# Patient Record
Sex: FEMALE | Race: BLACK OR AFRICAN AMERICAN
[De-identification: names, ages, dates, MRNs, and addresses within clinical notes are randomized per-mention and may not be internally consistent; named-entity substitution may affect disease eponyms.]

---

## 2018-02-13 ENCOUNTER — HOSPITAL ENCOUNTER (OUTPATIENT)
Dept: HOSPITAL 84 - D.ECHO | Age: 58
End: 2018-02-13
Attending: INTERNAL MEDICINE
Payer: MEDICAID

## 2018-02-13 DIAGNOSIS — R06.02: Primary | ICD-10-CM

## 2018-03-06 ENCOUNTER — HOSPITAL ENCOUNTER (OUTPATIENT)
Dept: HOSPITAL 84 - D.CT | Age: 58
Discharge: HOME | End: 2018-03-06
Attending: INTERNAL MEDICINE
Payer: MEDICAID

## 2018-03-06 DIAGNOSIS — J98.4: Primary | ICD-10-CM

## 2018-10-03 ENCOUNTER — HOSPITAL ENCOUNTER (OUTPATIENT)
Dept: HOSPITAL 84 - D.RT | Age: 58
Discharge: HOME | End: 2018-10-03
Attending: INTERNAL MEDICINE
Payer: MEDICAID

## 2018-10-03 DIAGNOSIS — R06.02: Primary | ICD-10-CM

## 2019-09-19 ENCOUNTER — HOSPITAL ENCOUNTER (OUTPATIENT)
Dept: HOSPITAL 84 - D.RT | Age: 59
Discharge: HOME | End: 2019-09-19
Attending: INTERNAL MEDICINE
Payer: MEDICAID

## 2019-09-19 DIAGNOSIS — R06.02: Primary | ICD-10-CM

## 2021-05-12 ENCOUNTER — HOSPITAL ENCOUNTER (INPATIENT)
Dept: HOSPITAL 84 - D.ER | Age: 61
LOS: 7 days | Discharge: HOME | DRG: 292 | End: 2021-05-19
Attending: FAMILY MEDICINE | Admitting: FAMILY MEDICINE
Payer: MEDICAID

## 2021-05-12 VITALS
BODY MASS INDEX: 53.92 KG/M2 | BODY MASS INDEX: 53.92 KG/M2 | BODY MASS INDEX: 53.92 KG/M2 | WEIGHT: 293 LBS | HEIGHT: 62 IN

## 2021-05-12 VITALS — DIASTOLIC BLOOD PRESSURE: 62 MMHG | SYSTOLIC BLOOD PRESSURE: 115 MMHG

## 2021-05-12 VITALS — SYSTOLIC BLOOD PRESSURE: 137 MMHG | DIASTOLIC BLOOD PRESSURE: 91 MMHG

## 2021-05-12 VITALS — DIASTOLIC BLOOD PRESSURE: 59 MMHG | SYSTOLIC BLOOD PRESSURE: 116 MMHG

## 2021-05-12 VITALS — DIASTOLIC BLOOD PRESSURE: 62 MMHG | SYSTOLIC BLOOD PRESSURE: 117 MMHG

## 2021-05-12 VITALS — DIASTOLIC BLOOD PRESSURE: 62 MMHG | SYSTOLIC BLOOD PRESSURE: 130 MMHG

## 2021-05-12 VITALS — SYSTOLIC BLOOD PRESSURE: 111 MMHG | DIASTOLIC BLOOD PRESSURE: 54 MMHG

## 2021-05-12 DIAGNOSIS — R06.00: ICD-10-CM

## 2021-05-12 DIAGNOSIS — D64.9: ICD-10-CM

## 2021-05-12 DIAGNOSIS — N18.9: ICD-10-CM

## 2021-05-12 DIAGNOSIS — E66.01: ICD-10-CM

## 2021-05-12 DIAGNOSIS — I50.9: ICD-10-CM

## 2021-05-12 DIAGNOSIS — N17.9: ICD-10-CM

## 2021-05-12 DIAGNOSIS — E11.22: ICD-10-CM

## 2021-05-12 DIAGNOSIS — J43.9: ICD-10-CM

## 2021-05-12 DIAGNOSIS — I13.0: Primary | ICD-10-CM

## 2021-05-12 DIAGNOSIS — E11.21: ICD-10-CM

## 2021-05-12 DIAGNOSIS — R00.1: ICD-10-CM

## 2021-05-12 DIAGNOSIS — K21.9: ICD-10-CM

## 2021-05-12 LAB
ALBUMIN SERPL-MCNC: 2.7 G/DL (ref 3.4–5)
ALP SERPL-CCNC: 177 U/L (ref 30–120)
ALT SERPL-CCNC: 103 U/L (ref 10–68)
ANION GAP SERPL CALC-SCNC: 13.7 MMOL/L (ref 8–16)
APTT BLD: 37.7 SECONDS (ref 22.8–39.4)
BASOPHILS NFR BLD AUTO: 0.3 % (ref 0–2)
BILIRUB SERPL-MCNC: 0.41 MG/DL (ref 0.2–1.3)
BUN SERPL-MCNC: 65 MG/DL (ref 7–18)
CALCIUM SERPL-MCNC: 8.9 MG/DL (ref 8.5–10.1)
CHLORIDE SERPL-SCNC: 106 MMOL/L (ref 98–107)
CK MB SERPL-MCNC: 1.2 U/L (ref 0–3.6)
CK SERPL-CCNC: 50 UL (ref 21–215)
CO2 SERPL-SCNC: 24.7 MMOL/L (ref 21–32)
CREAT SERPL-MCNC: 3.1 MG/DL (ref 0.6–1.3)
D DIMER PPP FEU-MCNC: 2.26 UG/MLFEU (ref 0.2–0.54)
EOSINOPHIL NFR BLD: 1.7 % (ref 0–7)
ERYTHROCYTE [DISTWIDTH] IN BLOOD BY AUTOMATED COUNT: 17.2 % (ref 11.5–14.5)
GLOBULIN SER-MCNC: 5 G/L
GLUCOSE SERPL-MCNC: 134 MG/DL (ref 74–106)
HCT VFR BLD CALC: 31.5 % (ref 36–48)
HGB BLD-MCNC: 9.8 G/DL (ref 12–16)
IMM GRANULOCYTES NFR BLD: 0.5 % (ref 0–5)
INR PPP: 1.84 (ref 0.85–1.17)
LYMPHOCYTES # BLD: 1.96 10X3/UL (ref 1.18–3.74)
LYMPHOCYTES NFR BLD AUTO: 18.7 % (ref 15–50)
MCH RBC QN AUTO: 26.7 PG (ref 26–34)
MCHC RBC AUTO-ENTMCNC: 31.1 G/DL (ref 31–37)
MCV RBC: 85.8 FL (ref 80–100)
MONOCYTES NFR BLD: 5.4 % (ref 2–11)
NEUTROPHILS # BLD: 7.68 10X3/UL (ref 1.56–6.13)
NEUTROPHILS NFR BLD AUTO: 73.4 % (ref 40–80)
OSMOLALITY SERPL CALC.SUM OF ELEC: 299 MOSM/KG (ref 275–300)
PLATELET # BLD: 360 10X3/UL (ref 130–400)
PMV BLD AUTO: 10.2 FL (ref 7.4–10.4)
POTASSIUM SERPL-SCNC: 4.4 MMOL/L (ref 3.5–5.1)
PROT SERPL-MCNC: 7.7 G/DL (ref 6.4–8.2)
PROTHROMBIN TIME: 19.7 SECONDS (ref 11.6–15)
RBC # BLD AUTO: 3.67 10X6/UL (ref 4–5.4)
SODIUM SERPL-SCNC: 140 MMOL/L (ref 136–145)
TROPONIN I SERPL-MCNC: < 0.017 NG/ML (ref 0–0.06)
WBC # BLD AUTO: 10.5 10X3/UL (ref 4.8–10.8)

## 2021-05-12 NOTE — NUR
REC'D TO ROOM 29779 AT THIS TIME VIA W/C. AWAKE AND ALERT. RESP EVEN AND
UNLABORED WITH NO DISTRESS NOTED. CAN EXPRESS NEEDS AND WANTS. NO C/O NOTED OR
VOICED AT THIS TIME. UP AB JOSE C. ASSESSMENT COMPLETED. C/L IN REACH AT BEDSIDE.

## 2021-05-12 NOTE — NUR
PT IN BED AT THIS TIME. ALERT AND ORIENTED. WEARING O2 AT 2L VIA NASAL
CANULA. ABLE TO MAKE WANTS AND NEEDS KNOWN CLEARLY. PT STATES SHE WEARS A CPAP
AT HOME BUT DOES NOT HAVE IT WITH HER. BED IN LOWEST POSITION WITH CALL LIGHT
IN REACH.

## 2021-05-13 VITALS — DIASTOLIC BLOOD PRESSURE: 85 MMHG | SYSTOLIC BLOOD PRESSURE: 143 MMHG

## 2021-05-13 VITALS — DIASTOLIC BLOOD PRESSURE: 61 MMHG | SYSTOLIC BLOOD PRESSURE: 141 MMHG

## 2021-05-13 VITALS — SYSTOLIC BLOOD PRESSURE: 136 MMHG | DIASTOLIC BLOOD PRESSURE: 43 MMHG

## 2021-05-13 VITALS — DIASTOLIC BLOOD PRESSURE: 44 MMHG | SYSTOLIC BLOOD PRESSURE: 134 MMHG

## 2021-05-13 VITALS — SYSTOLIC BLOOD PRESSURE: 132 MMHG | DIASTOLIC BLOOD PRESSURE: 52 MMHG

## 2021-05-13 VITALS — SYSTOLIC BLOOD PRESSURE: 138 MMHG | DIASTOLIC BLOOD PRESSURE: 44 MMHG

## 2021-05-13 LAB
ALBUMIN SERPL-MCNC: 2.6 G/DL (ref 3.4–5)
ALP SERPL-CCNC: 153 U/L (ref 30–120)
ALT SERPL-CCNC: 97 U/L (ref 10–68)
ANION GAP SERPL CALC-SCNC: 14.5 MMOL/L (ref 8–16)
BACTERIA #/AREA URNS HPF: (no result) HPF
BASOPHILS NFR BLD AUTO: 0.1 % (ref 0–2)
BILIRUB SERPL-MCNC: 0.41 MG/DL (ref 0.2–1.3)
BILIRUB SERPL-MCNC: NEGATIVE MG/DL
BUN SERPL-MCNC: 59 MG/DL (ref 7–18)
CALCIUM SERPL-MCNC: 8.8 MG/DL (ref 8.5–10.1)
CHLORIDE SERPL-SCNC: 105 MMOL/L (ref 98–107)
CO2 SERPL-SCNC: 23.7 MMOL/L (ref 21–32)
CREAT SERPL-MCNC: 2.9 MG/DL (ref 0.6–1.3)
CREATININE - URINE: 30.9 MG/DL (ref 30–125)
EOSINOPHIL NFR BLD: 2.4 % (ref 0–7)
ERYTHROCYTE [DISTWIDTH] IN BLOOD BY AUTOMATED COUNT: 17 % (ref 11.5–14.5)
GLOBULIN SER-MCNC: 5.1 G/L
GLUCOSE SERPL-MCNC: 71 MG/DL (ref 74–106)
HCT VFR BLD CALC: 30.8 % (ref 36–48)
HGB BLD-MCNC: 9.5 G/DL (ref 12–16)
IMM GRANULOCYTES NFR BLD: 0.1 % (ref 0–5)
KETONES UR STRIP-MCNC: NEGATIVE MG/DL
LYMPHOCYTES # BLD: 2.22 10X3/UL (ref 1.18–3.74)
LYMPHOCYTES NFR BLD AUTO: 22.9 % (ref 15–50)
MCH RBC QN AUTO: 26.7 PG (ref 26–34)
MCHC RBC AUTO-ENTMCNC: 30.8 G/DL (ref 31–37)
MCV RBC: 86.5 FL (ref 80–100)
MONOCYTES NFR BLD: 5.7 % (ref 2–11)
NEUTROPHILS # BLD: 6.66 10X3/UL (ref 1.56–6.13)
NEUTROPHILS NFR BLD AUTO: 68.8 % (ref 40–80)
NITRITE UR-MCNC: NEGATIVE MG/ML
OSMOLALITY SERPL CALC.SUM OF ELEC: 292 MOSM/KG (ref 275–300)
PH UR STRIP: 5 [PH] (ref 5–8)
PLATELET # BLD: 370 10X3/UL (ref 130–400)
PMV BLD AUTO: 10.3 FL (ref 7.4–10.4)
POTASSIUM SERPL-SCNC: 4.2 MMOL/L (ref 3.5–5.1)
PROT SERPL-MCNC: 7.7 G/DL (ref 6.4–8.2)
PROT UR-MCNC: 50.4 MG/DL (ref 0–11.9)
PROT/CREAT UR: 1.6 MG/G
RBC # BLD AUTO: 3.56 10X6/UL (ref 4–5.4)
SODIUM SERPL-SCNC: 139 MMOL/L (ref 136–145)
SQUAMOUS #/AREA URNS HPF: (no result) HPF (ref 0–4)
UROBILINOGEN UR-MCNC: NORMAL MG/DL (ref ?–2)
WBC # BLD AUTO: 9.7 10X3/UL (ref 4.8–10.8)
WBC #/AREA URNS HPF: (no result) HPF (ref 0–4)

## 2021-05-13 NOTE — NUR
PT REMAINS SOB WHEN AMBULATING TO BATHROOM. BSC BROUGHT TO ROOM AND PT STATES
IT WAS NOT AS HARD TO BREATHE USING IT. UNABLE TO OBTAIN URINE SPECIMEN DUE TO
    STOOL IN [place] AND THEN WIPES IN [place]. CALL LIGHT IN REACH.

## 2021-05-13 NOTE — NUR
PT IN BED RESTING. ALERT AND ORIENTED. WEARING O2 AT 2L NASAL CANULA. PT GETS
SHORT OF BREATH WITH EXERTION. SCDS ON. ABLE TO MAKE WANTS AND NEEDS KNOWN TO
STAFF. USES BEDSIDE COMMODE X1 ASSIST. BED IN LOWEST POSITION WITH CALL LIGHT
IN REACH.

## 2021-05-14 VITALS — DIASTOLIC BLOOD PRESSURE: 79 MMHG | SYSTOLIC BLOOD PRESSURE: 128 MMHG

## 2021-05-14 VITALS — SYSTOLIC BLOOD PRESSURE: 142 MMHG | DIASTOLIC BLOOD PRESSURE: 47 MMHG

## 2021-05-14 VITALS — SYSTOLIC BLOOD PRESSURE: 141 MMHG | DIASTOLIC BLOOD PRESSURE: 77 MMHG

## 2021-05-14 VITALS — SYSTOLIC BLOOD PRESSURE: 142 MMHG | DIASTOLIC BLOOD PRESSURE: 77 MMHG

## 2021-05-14 VITALS — DIASTOLIC BLOOD PRESSURE: 73 MMHG | SYSTOLIC BLOOD PRESSURE: 171 MMHG

## 2021-05-14 VITALS — SYSTOLIC BLOOD PRESSURE: 113 MMHG | DIASTOLIC BLOOD PRESSURE: 47 MMHG

## 2021-05-14 LAB
ALBUMIN SERPL-MCNC: 2.4 G/DL (ref 3.4–5)
ALP SERPL-CCNC: 142 U/L (ref 30–120)
ALT SERPL-CCNC: 77 U/L (ref 10–68)
ANION GAP SERPL CALC-SCNC: 15.6 MMOL/L (ref 8–16)
BASOPHILS NFR BLD AUTO: 0.1 % (ref 0–2)
BILIRUB SERPL-MCNC: 0.39 MG/DL (ref 0.2–1.3)
BUN SERPL-MCNC: 56 MG/DL (ref 7–18)
CALCIUM SERPL-MCNC: 8.5 MG/DL (ref 8.5–10.1)
CHLORIDE SERPL-SCNC: 105 MMOL/L (ref 98–107)
CO2 SERPL-SCNC: 23.4 MMOL/L (ref 21–32)
CREAT SERPL-MCNC: 2.9 MG/DL (ref 0.6–1.3)
EOSINOPHIL NFR BLD: 2.5 % (ref 0–7)
ERYTHROCYTE [DISTWIDTH] IN BLOOD BY AUTOMATED COUNT: 16.9 % (ref 11.5–14.5)
FERRITIN SERPL-MCNC: 89 NG/ML (ref 3–244)
GLOBULIN SER-MCNC: 5 G/L
GLUCOSE SERPL-MCNC: 81 MG/DL (ref 74–106)
HCT VFR BLD CALC: 30.1 % (ref 36–48)
HGB BLD-MCNC: 9.1 G/DL (ref 12–16)
IMM GRANULOCYTES NFR BLD: 0.2 % (ref 0–5)
IRON SERPL-MCNC: 30 UG/DL (ref 35–150)
LYMPHOCYTES # BLD: 2.07 10X3/UL (ref 1.18–3.74)
LYMPHOCYTES NFR BLD AUTO: 21.4 % (ref 15–50)
MCH RBC QN AUTO: 26 PG (ref 26–34)
MCHC RBC AUTO-ENTMCNC: 30.2 G/DL (ref 31–37)
MCV RBC: 86 FL (ref 80–100)
MONOCYTES NFR BLD: 5.4 % (ref 2–11)
NEUTROPHILS # BLD: 6.83 10X3/UL (ref 1.56–6.13)
NEUTROPHILS NFR BLD AUTO: 70.4 % (ref 40–80)
OSMOLALITY SERPL CALC.SUM OF ELEC: 293 MOSM/KG (ref 275–300)
PLATELET # BLD: 349 10X3/UL (ref 130–400)
PMV BLD AUTO: 10.4 FL (ref 7.4–10.4)
POTASSIUM SERPL-SCNC: 4 MMOL/L (ref 3.5–5.1)
PROT SERPL-MCNC: 7.4 G/DL (ref 6.4–8.2)
RBC # BLD AUTO: 3.5 10X6/UL (ref 4–5.4)
SAO2 % BLD FROM PO2: 11 % (ref 15–55)
SODIUM SERPL-SCNC: 140 MMOL/L (ref 136–145)
TIBC SERPL-MCNC: 270 UG/DL (ref 260–445)
UIBC SERPL-MCNC: 240 UG/DL (ref 150–375)
WBC # BLD AUTO: 9.7 10X3/UL (ref 4.8–10.8)

## 2021-05-14 NOTE — NUR
REC'D IN BED AWAKE AND ALERT TO NAME ONLY WITH NOTED CONFUSION. RESP EVEN AND
UNLABORED WITH NO DISTRESS NOTED. CAN EXPRESS NEEDS AND WANTS. ASSESSMENT
COMPLETED. C/L IN REACH AT BEDSIDE.

## 2021-05-15 VITALS — SYSTOLIC BLOOD PRESSURE: 127 MMHG | DIASTOLIC BLOOD PRESSURE: 91 MMHG

## 2021-05-15 VITALS — SYSTOLIC BLOOD PRESSURE: 119 MMHG | DIASTOLIC BLOOD PRESSURE: 54 MMHG

## 2021-05-15 VITALS — SYSTOLIC BLOOD PRESSURE: 117 MMHG | DIASTOLIC BLOOD PRESSURE: 60 MMHG

## 2021-05-15 VITALS — SYSTOLIC BLOOD PRESSURE: 142 MMHG | DIASTOLIC BLOOD PRESSURE: 47 MMHG

## 2021-05-15 VITALS — DIASTOLIC BLOOD PRESSURE: 75 MMHG | SYSTOLIC BLOOD PRESSURE: 96 MMHG

## 2021-05-15 VITALS — SYSTOLIC BLOOD PRESSURE: 123 MMHG | DIASTOLIC BLOOD PRESSURE: 50 MMHG

## 2021-05-15 LAB
ALBUMIN SERPL-MCNC: 2.5 G/DL (ref 3.4–5)
ALP SERPL-CCNC: 144 U/L (ref 30–120)
ALT SERPL-CCNC: 60 U/L (ref 10–68)
ANION GAP SERPL CALC-SCNC: 15 MMOL/L (ref 8–16)
BASOPHILS NFR BLD AUTO: 0.1 % (ref 0–2)
BILIRUB SERPL-MCNC: 0.36 MG/DL (ref 0.2–1.3)
BUN SERPL-MCNC: 59 MG/DL (ref 7–18)
CALCIUM SERPL-MCNC: 8.8 MG/DL (ref 8.5–10.1)
CHLORIDE SERPL-SCNC: 105 MMOL/L (ref 98–107)
CO2 SERPL-SCNC: 24.3 MMOL/L (ref 21–32)
CREAT SERPL-MCNC: 2.9 MG/DL (ref 0.6–1.3)
EOSINOPHIL NFR BLD: 1.8 % (ref 0–7)
ERYTHROCYTE [DISTWIDTH] IN BLOOD BY AUTOMATED COUNT: 17 % (ref 11.5–14.5)
GLOBULIN SER-MCNC: 4.7 G/L
GLUCOSE SERPL-MCNC: 101 MG/DL (ref 74–106)
HCT VFR BLD CALC: 30.1 % (ref 36–48)
HGB BLD-MCNC: 9.1 G/DL (ref 12–16)
IMM GRANULOCYTES NFR BLD: 0.3 % (ref 0–5)
LYMPHOCYTES # BLD: 2.12 10X3/UL (ref 1.18–3.74)
LYMPHOCYTES NFR BLD AUTO: 21 % (ref 15–50)
MCH RBC QN AUTO: 26.3 PG (ref 26–34)
MCHC RBC AUTO-ENTMCNC: 30.2 G/DL (ref 31–37)
MCV RBC: 87 FL (ref 80–100)
MONOCYTES NFR BLD: 6.4 % (ref 2–11)
NEUTROPHILS # BLD: 7.11 10X3/UL (ref 1.56–6.13)
NEUTROPHILS NFR BLD AUTO: 70.4 % (ref 40–80)
OSMOLALITY SERPL CALC.SUM OF ELEC: 295 MOSM/KG (ref 275–300)
PLATELET # BLD: 341 10X3/UL (ref 130–400)
PMV BLD AUTO: 10.2 FL (ref 7.4–10.4)
POTASSIUM SERPL-SCNC: 4.3 MMOL/L (ref 3.5–5.1)
PROT SERPL-MCNC: 7.2 G/DL (ref 6.4–8.2)
RBC # BLD AUTO: 3.46 10X6/UL (ref 4–5.4)
SODIUM SERPL-SCNC: 140 MMOL/L (ref 136–145)
WBC # BLD AUTO: 10.1 10X3/UL (ref 4.8–10.8)

## 2021-05-15 NOTE — NUR
20 G IV RESITED TO RIGHT FOREARM BY YONI RIVER. RESUMED IRON INFUSION. NO OTHER
NEEDS. ASSESSMENT PER FLOWSHEET. WILL CONTINUE TO MONITOR.

## 2021-05-15 NOTE — NUR
REC'D IN BED WITH EYES CLOSED EASILY TO AROUSED WHEN NAME IS CALLED. RESP EVEN
AND UNLABORED WITH NO DISTRESS NOTED. TURN AND REPOSITION SELF AB JOSE C. STAND
BY ASSISTANCE WITH ALL TRANSFERS. ASSESSMENT COMPLETED. C/L IN REACH AT
BEDSIDE.

## 2021-05-16 VITALS — DIASTOLIC BLOOD PRESSURE: 68 MMHG | SYSTOLIC BLOOD PRESSURE: 134 MMHG

## 2021-05-16 VITALS — DIASTOLIC BLOOD PRESSURE: 64 MMHG | SYSTOLIC BLOOD PRESSURE: 153 MMHG

## 2021-05-16 LAB
ALBUMIN SERPL-MCNC: 2.6 G/DL (ref 3.4–5)
ALP SERPL-CCNC: 155 U/L (ref 30–120)
ALT SERPL-CCNC: 56 U/L (ref 10–68)
ANION GAP SERPL CALC-SCNC: 11.7 MMOL/L (ref 8–16)
BASOPHILS NFR BLD AUTO: 0.1 % (ref 0–2)
BILIRUB SERPL-MCNC: 0.31 MG/DL (ref 0.2–1.3)
BUN SERPL-MCNC: 62 MG/DL (ref 7–18)
CALCIUM SERPL-MCNC: 8.7 MG/DL (ref 8.5–10.1)
CHLORIDE SERPL-SCNC: 104 MMOL/L (ref 98–107)
CO2 SERPL-SCNC: 26.8 MMOL/L (ref 21–32)
CREAT SERPL-MCNC: 2.9 MG/DL (ref 0.6–1.3)
EOSINOPHIL NFR BLD: 2.4 % (ref 0–7)
ERYTHROCYTE [DISTWIDTH] IN BLOOD BY AUTOMATED COUNT: 17.1 % (ref 11.5–14.5)
GLOBULIN SER-MCNC: 4.4 G/L
GLUCOSE SERPL-MCNC: 92 MG/DL (ref 74–106)
HCT VFR BLD CALC: 31.3 % (ref 36–48)
HGB BLD-MCNC: 9.3 G/DL (ref 12–16)
IMM GRANULOCYTES NFR BLD: 0.2 % (ref 0–5)
LYMPHOCYTES # BLD: 1.79 10X3/UL (ref 1.18–3.74)
LYMPHOCYTES NFR BLD AUTO: 17.6 % (ref 15–50)
MCH RBC QN AUTO: 25.9 PG (ref 26–34)
MCHC RBC AUTO-ENTMCNC: 29.7 G/DL (ref 31–37)
MCV RBC: 87.2 FL (ref 80–100)
MONOCYTES NFR BLD: 6.5 % (ref 2–11)
NEUTROPHILS # BLD: 7.47 10X3/UL (ref 1.56–6.13)
NEUTROPHILS NFR BLD AUTO: 73.2 % (ref 40–80)
OSMOLALITY SERPL CALC.SUM OF ELEC: 293 MOSM/KG (ref 275–300)
PLATELET # BLD: 331 10X3/UL (ref 130–400)
PMV BLD AUTO: 10.5 FL (ref 7.4–10.4)
POTASSIUM SERPL-SCNC: 4.5 MMOL/L (ref 3.5–5.1)
PROT SERPL-MCNC: 7 G/DL (ref 6.4–8.2)
RBC # BLD AUTO: 3.59 10X6/UL (ref 4–5.4)
SODIUM SERPL-SCNC: 138 MMOL/L (ref 136–145)
WBC # BLD AUTO: 10.2 10X3/UL (ref 4.8–10.8)

## 2021-05-17 VITALS — DIASTOLIC BLOOD PRESSURE: 83 MMHG | SYSTOLIC BLOOD PRESSURE: 169 MMHG

## 2021-05-17 VITALS — SYSTOLIC BLOOD PRESSURE: 114 MMHG | DIASTOLIC BLOOD PRESSURE: 63 MMHG

## 2021-05-17 VITALS — SYSTOLIC BLOOD PRESSURE: 165 MMHG | DIASTOLIC BLOOD PRESSURE: 73 MMHG

## 2021-05-17 VITALS — DIASTOLIC BLOOD PRESSURE: 48 MMHG | SYSTOLIC BLOOD PRESSURE: 133 MMHG

## 2021-05-17 LAB
ALBUMIN SERPL-MCNC: 2.7 G/DL (ref 3.4–5)
ALP SERPL-CCNC: 123 U/L (ref 30–120)
ALT SERPL-CCNC: 48 U/L (ref 10–68)
ANION GAP SERPL CALC-SCNC: 13.7 MMOL/L (ref 8–16)
BASOPHILS NFR BLD AUTO: 0.1 % (ref 0–2)
BILIRUB SERPL-MCNC: 0.57 MG/DL (ref 0.2–1.3)
BUN SERPL-MCNC: 49 MG/DL (ref 7–18)
CALCIUM SERPL-MCNC: 9 MG/DL (ref 8.5–10.1)
CHLORIDE SERPL-SCNC: 107 MMOL/L (ref 98–107)
CO2 SERPL-SCNC: 24.1 MMOL/L (ref 21–32)
CREAT SERPL-MCNC: 2.1 MG/DL (ref 0.6–1.3)
EOSINOPHIL NFR BLD: 2.8 % (ref 0–7)
ERYTHROCYTE [DISTWIDTH] IN BLOOD BY AUTOMATED COUNT: 17.2 % (ref 11.5–14.5)
GLOBULIN SER-MCNC: 4.6 G/L
GLUCOSE SERPL-MCNC: 60 MG/DL (ref 74–106)
HCT VFR BLD CALC: 32.3 % (ref 36–48)
HGB BLD-MCNC: 9.8 G/DL (ref 12–16)
IMM GRANULOCYTES NFR BLD: 0.2 % (ref 0–5)
LYMPHOCYTES # BLD: 1.74 10X3/UL (ref 1.18–3.74)
LYMPHOCYTES NFR BLD AUTO: 19.6 % (ref 15–50)
MCH RBC QN AUTO: 26.1 PG (ref 26–34)
MCHC RBC AUTO-ENTMCNC: 30.3 G/DL (ref 31–37)
MCV RBC: 85.9 FL (ref 80–100)
MONOCYTES NFR BLD: 8.1 % (ref 2–11)
NEUTROPHILS # BLD: 6.12 10X3/UL (ref 1.56–6.13)
NEUTROPHILS NFR BLD AUTO: 69.2 % (ref 40–80)
OSMOLALITY SERPL CALC.SUM OF ELEC: 291 MOSM/KG (ref 275–300)
PLATELET # BLD: 301 10X3/UL (ref 130–400)
PMV BLD AUTO: 10.5 FL (ref 7.4–10.4)
POTASSIUM SERPL-SCNC: 3.8 MMOL/L (ref 3.5–5.1)
PROT SERPL-MCNC: 7.3 G/DL (ref 6.4–8.2)
RBC # BLD AUTO: 3.76 10X6/UL (ref 4–5.4)
SODIUM SERPL-SCNC: 141 MMOL/L (ref 136–145)
WBC # BLD AUTO: 8.9 10X3/UL (ref 4.8–10.8)

## 2021-05-17 PROCEDURE — 0DJD8ZZ INSPECTION OF LOWER INTESTINAL TRACT, VIA NATURAL OR ARTIFICIAL OPENING ENDOSCOPIC: ICD-10-PCS | Performed by: SURGERY

## 2021-05-17 PROCEDURE — 0DJ08ZZ INSPECTION OF UPPER INTESTINAL TRACT, VIA NATURAL OR ARTIFICIAL OPENING ENDOSCOPIC: ICD-10-PCS | Performed by: SURGERY

## 2021-05-17 NOTE — NUR
BACK FROM GI LAB, ALERT VVS, DENIES PAIN OR NEEDS AT THIS TIME, TELEMENTRY
REPLACED, SEE SHIFT ASSESSMENT, CALL LIGHT IN PLACE

## 2021-05-18 VITALS — DIASTOLIC BLOOD PRESSURE: 67 MMHG | SYSTOLIC BLOOD PRESSURE: 134 MMHG

## 2021-05-18 VITALS — DIASTOLIC BLOOD PRESSURE: 80 MMHG | SYSTOLIC BLOOD PRESSURE: 143 MMHG

## 2021-05-18 VITALS — DIASTOLIC BLOOD PRESSURE: 63 MMHG | SYSTOLIC BLOOD PRESSURE: 129 MMHG

## 2021-05-18 VITALS — DIASTOLIC BLOOD PRESSURE: 84 MMHG | SYSTOLIC BLOOD PRESSURE: 135 MMHG

## 2021-05-18 VITALS — SYSTOLIC BLOOD PRESSURE: 158 MMHG | DIASTOLIC BLOOD PRESSURE: 71 MMHG

## 2021-05-18 VITALS — DIASTOLIC BLOOD PRESSURE: 65 MMHG | SYSTOLIC BLOOD PRESSURE: 129 MMHG

## 2021-05-18 LAB
ALBUMIN SERPL-MCNC: 2.6 G/DL (ref 3.4–5)
ALP SERPL-CCNC: 116 U/L (ref 30–120)
ALT SERPL-CCNC: 38 U/L (ref 10–68)
ANION GAP SERPL CALC-SCNC: 15.6 MMOL/L (ref 8–16)
BASOPHILS NFR BLD AUTO: 0.5 % (ref 0–2)
BILIRUB SERPL-MCNC: 0.62 MG/DL (ref 0.2–1.3)
BUN SERPL-MCNC: 41 MG/DL (ref 7–18)
CALCIUM SERPL-MCNC: 9 MG/DL (ref 8.5–10.1)
CHLORIDE SERPL-SCNC: 108 MMOL/L (ref 98–107)
CO2 SERPL-SCNC: 23.5 MMOL/L (ref 21–32)
CREAT SERPL-MCNC: 2 MG/DL (ref 0.6–1.3)
EOSINOPHIL NFR BLD: 2.5 % (ref 0–7)
ERYTHROCYTE [DISTWIDTH] IN BLOOD BY AUTOMATED COUNT: 18 % (ref 11.5–14.5)
GLOBULIN SER-MCNC: 4.1 G/L
GLUCOSE SERPL-MCNC: 78 MG/DL (ref 74–106)
HCT VFR BLD CALC: 30.5 % (ref 36–48)
HGB BLD-MCNC: 10 G/DL (ref 12–16)
LYMPHOCYTES NFR BLD AUTO: 19.2 % (ref 15–50)
MCH RBC QN AUTO: 27.7 PG (ref 26–34)
MCHC RBC AUTO-ENTMCNC: 32.7 G/DL (ref 31–37)
MCV RBC: 84.7 FL (ref 80–100)
MONOCYTES NFR BLD: 6.1 % (ref 2–11)
NEUTROPHILS # BLD: 6.1 10X3/UL (ref 1.56–6.13)
NEUTROPHILS NFR BLD AUTO: 71.7 % (ref 40–80)
OSMOLALITY SERPL CALC.SUM OF ELEC: 293 MOSM/KG (ref 275–300)
PLATELET # BLD: 268 10X3/UL (ref 130–400)
PMV BLD AUTO: 8.1 FL (ref 7.4–10.4)
POTASSIUM SERPL-SCNC: 4.1 MMOL/L (ref 3.5–5.1)
PROT SERPL-MCNC: 6.7 G/DL (ref 6.4–8.2)
RBC # BLD AUTO: 3.6 10X6/UL (ref 4–5.4)
SODIUM SERPL-SCNC: 143 MMOL/L (ref 136–145)
WBC # BLD AUTO: 8.6 10X3/UL (ref 4.8–10.8)

## 2021-05-18 NOTE — NUR
Nutrition follow-up:
Pt resting with CPAP in place
s/p EGD with colonscopy; both normal per GI
Diet advancing as tolerated
labs reviewed; still with renal issues
Wt: [phone]#
Will provide food choices with selective menus and encourage po intake
Will offer nutritional supplements.
RDN will follow-up: 5/21/21

## 2021-05-18 NOTE — NUR
IN PT ROOM AND ASSESSMENT COMPLETED.  MEDS ALSO GIVEN.  PT HAS NO C/O AT THIS
TIME.  SHE CONT TO BE ON A [facility]. SOFT DIET.  SHE IS WEARING O2 AT 2L/NC.
SHE IS WEARING SCD. SHE IS ON TELE WITH [employer].  RATE IN THE 90'S.  SHE GETS
UP IN HER ROOM BUT THAT IS ALL.  HER BS  AND SHE RECIEVED 2 UNITS OF
INSULIN.  PT HAS A BSC BESIDE HER BED BUT SHE DOES GO INTO THE BR SOME.

## 2021-05-18 NOTE — NUR
WALKING REPORT DONE.  EXPLAINED TO PT THAT I WOULD BE BACK TO DO HER
ASSESSMENT AND GIVE MEDS methadone, Klonopin

## 2021-05-19 VITALS — DIASTOLIC BLOOD PRESSURE: 63 MMHG | SYSTOLIC BLOOD PRESSURE: 161 MMHG

## 2021-05-19 VITALS — SYSTOLIC BLOOD PRESSURE: 150 MMHG | DIASTOLIC BLOOD PRESSURE: 87 MMHG

## 2021-05-19 VITALS — DIASTOLIC BLOOD PRESSURE: 77 MMHG | SYSTOLIC BLOOD PRESSURE: 149 MMHG

## 2021-05-19 VITALS — SYSTOLIC BLOOD PRESSURE: 140 MMHG | DIASTOLIC BLOOD PRESSURE: 86 MMHG

## 2021-05-19 LAB
ALBUMIN SERPL-MCNC: 2.7 G/DL (ref 3.4–5)
ALP SERPL-CCNC: 115 U/L (ref 30–120)
ALT SERPL-CCNC: 33 U/L (ref 10–68)
ANION GAP SERPL CALC-SCNC: 13.5 MMOL/L (ref 8–16)
BASOPHILS NFR BLD AUTO: 1 % (ref 0–2)
BILIRUB SERPL-MCNC: 0.54 MG/DL (ref 0.2–1.3)
BUN SERPL-MCNC: 34 MG/DL (ref 7–18)
CALCIUM SERPL-MCNC: 8.8 MG/DL (ref 8.5–10.1)
CHLORIDE SERPL-SCNC: 107 MMOL/L (ref 98–107)
CO2 SERPL-SCNC: 24.6 MMOL/L (ref 21–32)
CREAT SERPL-MCNC: 2.2 MG/DL (ref 0.6–1.3)
EOSINOPHIL NFR BLD: 2 % (ref 0–7)
ERYTHROCYTE [DISTWIDTH] IN BLOOD BY AUTOMATED COUNT: 18.3 % (ref 11.5–14.5)
GLOBULIN SER-MCNC: 4.1 G/L
GLUCOSE SERPL-MCNC: 118 MG/DL (ref 74–106)
HCT VFR BLD CALC: 30.2 % (ref 36–48)
HGB BLD-MCNC: 9.6 G/DL (ref 12–16)
IRON SERPL-MCNC: 33 UG/DL (ref 35–150)
LYMPHOCYTES # BLD: 1.6 10X3/UL (ref 1.18–3.74)
LYMPHOCYTES NFR BLD AUTO: 14.8 % (ref 15–50)
MAGNESIUM SERPL-MCNC: 1.8 MG/DL (ref 1.8–2.4)
MCH RBC QN AUTO: 26.8 PG (ref 26–34)
MCHC RBC AUTO-ENTMCNC: 31.8 G/DL (ref 31–37)
MCV RBC: 84.1 FL (ref 80–100)
MONOCYTES NFR BLD: 5.4 % (ref 2–11)
NEUTROPHILS # BLD: 8.2 10X3/UL (ref 1.56–6.13)
NEUTROPHILS NFR BLD AUTO: 76.8 % (ref 40–80)
OSMOLALITY SERPL CALC.SUM OF ELEC: 289 MOSM/KG (ref 275–300)
PHOSPHATE SERPL-MCNC: 3.4 MG/DL (ref 2.5–4.9)
PLATELET # BLD EST: NORMAL 10*3/UL
PLATELET # BLD: 246 10X3/UL (ref 130–400)
PMV BLD AUTO: 7.7 FL (ref 7.4–10.4)
POTASSIUM SERPL-SCNC: 4.1 MMOL/L (ref 3.5–5.1)
PROT SERPL-MCNC: 6.8 G/DL (ref 6.4–8.2)
RBC # BLD AUTO: 3.59 10X6/UL (ref 4–5.4)
ROULEAUX BLD QL SMEAR: (no result)
SAO2 % BLD FROM PO2: 13 % (ref 15–55)
SODIUM SERPL-SCNC: 141 MMOL/L (ref 136–145)
TIBC SERPL-MCNC: 242 UG/DL (ref 260–445)
UIBC SERPL-MCNC: 209 UG/DL (ref 150–375)
WBC # BLD AUTO: 10.7 10X3/UL (ref 4.8–10.8)

## 2021-05-19 NOTE — MORECARE
CASE MANAGEMENT DISCHARGE SUMMARY
 
 
PATIENT: KALYANI PIERSON                     UNIT: [id number]
ACCOUNT#: [id number]                       ADM DATE: 21
AGE: 60     : 58  SEX: F            ROOM/BED: D.2203    
AUTHOR: ELSIEDOC                             PHYSICIAN:                               
 
REFERRING PHYSICIAN: KATY VUONG MD            
DATE OF SERVICE: 21
Case Management Discharge Planning Summary
 
 
 
 
 
DCP REVIEW SUMMARY
ANTICIPATED D/C DATE: 
EXPECTED LOS : 
CASE STATUS:  DCP Initiated
INITIAL REVIEW:  2021
INITIAL REVIEWER:   Candida George
FINAL DISCHARGE DISPOSITION: 01 : Home or Self Care (Routine Discharge)
FINAL REVIEWER:  
FINAL REVIEW DATE: 
 
  
 
DCP Focus Questions & Answers
 
DCP Screen
QUESTION: ANSWER
High Risk Factors: : Hosp related to CHF, COPD, DM, End Stage Ds, CVA, CA
 
 
 
DCP Evaluation
QUESTION: ANSWER
Patient's ability to cope with chronic illness : d. No chronic illness
Physical Status: : Partial care dependence
Physical Status: : Mobility impaired
Partial Dependence, assistance required for: : Ambulation / Mobility
Baseline cognitive status: : *Oriented to person, place, situation, time and 
present
Would patient like to participate in any Care Coordination programs (if 
applicable): : Not applicable
Mental health screen: : No mental health history
 
 
 
 
DCP Re-evaluation
QUESTION: ANSWER
Would patient like to participate in any Care Coordination programs (if 
applicable): : Not applicable
 
 
 
 
 
 
 
PATIENT:  KALYANI PIERSON
ENCOUNTER:  [id number]
MEDICAL RECORD#:  [id number]
ADMISSION DATE:  2021
DISCHARGE DATE:  
ATTENDING MD:  KATY SCHWARTZ
:   1960-Aug-25
AGE:  60
MARITAL STATUS:   S
DC PLAN ID:  [id number]
FACILITY:   [facility]
PRINTED ON: 21  12:45  CT
 
 
 
 
 
 
 
 
**All edits/amendments must be made on the electronic document**
 
DICTATION DATE: 21 124     : VICTORIANO  21 124     
RPT#: [id number]                                DC DATE:        
                                               STATUS: ADM IN  
[facility]
 [place] [address]
***END OF REPORT***

## 2021-05-19 NOTE — MORECARE
CASE MANAGEMENT DISCHARGE SUMMARY
 
 
PATIENT: KALYANI PIERSON                     UNIT: [id number]
ACCOUNT#: [id number]                       ADM DATE: 21
AGE: 60     : 58  SEX: F            ROOM/BED: D.2203    
AUTHOR: ELSIE,DOC                             PHYSICIAN:                               
 
REFERRING PHYSICIAN: KATY VUONG MD            
DATE OF SERVICE: 21
Case Management Discharge Planning Summary
 
 
COMMENTS 
ENTERED DATE:  21  12:50 CT
COMMENT TYPE:  Discharge Planning
REVIEWER: Candida George
CM met with patient to complete initial dc planning assessment.  CM educated 
patient on the CM role and verbal consent given by patient to complete 
assessment.   Patient lives at home with her adult son and grandson where 
she is mainly independent with her care.  At discharge patient plans to 
return home and feels this is a safe discharge.  Her daughter will be her 
 home.   CM discussed availability of home health, rehab services, and 
medical equipment. She did not feel like she needed home health.  She said 
that she has everything she needs when she gets to go home.  She has a 
walker, wheelchair, BSC, glucometer, triliogy ( [employer]) home O2 portable and 
concentrator. ( she gets it from a DME in [place]) nebulizer, glucometer. 
She stated her daughter is her caretaker at home. Patient denied known 
discharge needs at this time. CM will continue to follow and will assist as 
needed with dc plans/needs.
 
 
DCP REVIEW SUMMARY
ANTICIPATED D/C DATE: 
EXPECTED LOS : 
CASE STATUS:  DCP Initiated
INITIAL REVIEW:  2021
INITIAL REVIEWER:   Candida George
FINAL DISCHARGE DISPOSITION: 01 : Home or Self Care (Routine Discharge)
FINAL REVIEWER:  
FINAL REVIEW DATE: 
 
  
 
DCP Focus Questions & Answers
 
DCP Screen
QUESTION: ANSWER
High Risk Factors: : Hosp related to CHF, COPD, DM, End Stage Ds, CVA, CA
 
 
 
 
DCP Evaluation
QUESTION: ANSWER
Patient's ability to cope with chronic illness : d. No chronic illness
Patient and/or caregiver agree upon recommended discharge plan? : Yes
Family / Caregiver's ability to cope with chronic illness: : a. Adequate 
(ability to meet patient's medical needs, ensures patient attends medical 
appts.)
Patient's current cognitive status: : *Oriented to person, place, situation, 
time and present
Physical Status: : Partial care dependence
Physical Status: : Mobility impaired
Does the patient have the ability to pay for or attain post discharge needs 
/ services? : Yes
Functional screen assessment: : Can meet basic needs but may require 
referral for resources
Family / Caregiver's ability to cope with chronic illness: : a. Adequate 
(ability to meet patient's medical needs, ensures patient attends medical 
appts.)
Partial Dependence, assistance required for: : Ambulation / Mobility
Is there a likelihood that the patient will require additional services to 
return to the preadmission environment? : No
Living Arrangements: : Home with Extended Family
Baseline cognitive status: : *Oriented to person, place, situation, time and 
present
Other Equipment comments: : DENIES ANY NEEDS
Results of this evaluation have been discussed with: : Patient
Patient with capacity for self-care or can be cared for in same environment 
as prior to hospitalization? : Yes
Living arrangements comments: : LIVES WITH HER SON AND GRANDSON
Medication Management: : Patient states can read and understand medication 
labels
Medication Management: : Patient states can afford medications
Pharmacy name(s): : [facility]
Does Patient have transportation to get home and to follow-up medical 
appointments when discharged from the hospital? : Yes
Would patient like to participate in any Care Coordination programs (if 
applicable): : Not applicable
Comments: : DAUGHTER WILL BE HER  HOME
Does the patient have electricity at home? : Yes
Does the patient have running water in their house? : Yes
Equipment in use: : Wheelchair
Equipment in use: : Walker - Rolling
Equipment in use: : Ventilator (home)
Equipment in use: : Shower Chair
Equipment in use: : Other
Equipment in use: : Nebulizer
Equipment in use: : Home Oxygen with Nasal Cannula
Equipment in use: : Glucometer
Equipment in use: : Bedside Commode
Mental health screen: : No mental health history
 
Psychosocial status: : Adult with physical limitations
Abuse/Neglect: : None
Resources / Services in place: : [place] [facility] on [facility]
Contact information for resources in use: : DAUGHTER IS HER CARE TAKER
 
 
 
DCP Re-evaluation
QUESTION: ANSWER
Would patient like to participate in any Care Coordination programs (if 
applicable): : Not applicable
 
 
 
 
 
 
 
PATIENT:  KALYANI PIERSON
ENCOUNTER:  [id number]
MEDICAL RECORD#:  [id number]
ADMISSION DATE:  2021
DISCHARGE DATE:  
ATTENDING MD:  KATY SCHWARTZ
:   1960-Aug-25
AGE:  60
MARITAL STATUS:   S
DC PLAN ID:  [id number]
FACILITY:   [facility]
PRINTED ON: 21  12:59  CT
 
 
 
 
 
 
 
 
**All edits/amendments must be made on the electronic document**
 
DICTATION DATE: 21     : VICTORIANO  21     
RPT#: [id number]                                DC DATE:        
                                               STATUS: ADM IN  
[facility]
 [place] [address]
***END OF REPORT***

## 2021-05-19 NOTE — NUR
PT. RECEIVED RESTING IN BED, SWITCHED FROM BIPAP TO 2L O2 NC ON HER OWN. SCDS
ON, NON SKID SOCKS ON. PT. ALERT AND ORIENTED. NO PAIN OR NEEDS VOICED. IV
INTACT TO R. FA. SALINE LOCKED. CL IN REACH, SR UP X2.

## 2021-05-19 NOTE — NUR
PT DISCHARGE INSTRUCTIONS READ AND VERBALIZED UNDERSTANDING. IV REMOVED. PT
WAITING FOR DTR TO PICK HER UP WITH HOME 02 PORTABLE TANK,
WILL NOT BE HERE UNTIL 1930. TELE MONITOR TO
BE REMOVED ONCE PT IS READY FOR PICKUP.

## 2021-05-20 NOTE — MORECARE
CASE MANAGEMENT DISCHARGE SUMMARY
 
 
PATIENT: KALYANI PIERSON                     UNIT: [id number]
ACCOUNT#: [id number]                       ADM DATE: 21
AGE: 60     : 58  SEX: F            ROOM/BED: D.2203    
AUTHOR: ELSIE,DOC                             PHYSICIAN:                               
 
REFERRING PHYSICIAN: KATY VUOGN MD            
DATE OF SERVICE: 21
Case Management Discharge Planning Summary
 
 
COMMENTS 
ENTERED DATE:  21  12:50 CT
COMMENT TYPE:  Discharge Planning
REVIEWER: Candida George
CM met with patient to complete initial dc planning assessment.  CM educated 
patient on the CM role and verbal consent given by patient to complete 
assessment.   Patient lives at home with her adult son and grandson where 
she is mainly independent with her care.  At discharge patient plans to 
return home and feels this is a safe discharge.  Her daughter will be her 
 home.   CM discussed availability of home health, rehab services, and 
medical equipment. She did not feel like she needed home health.  She said 
that she has everything she needs when she gets to go home.  She has a 
walker, wheelchair, BSC, glucometer, triliogy ( [employer]) home O2 portable and 
concentrator. ( she gets it from a DME in [place]) nebulizer, glucometer. 
She stated her daughter is her caretaker at home. Patient denied known 
discharge needs at this time. CM will continue to follow and will assist as 
needed with dc plans/needs.
 
 
DCP REVIEW SUMMARY
ANTICIPATED D/C DATE: 
EXPECTED LOS : 0
CASE STATUS:  DCP Complete
INITIAL REVIEW:  2021
INITIAL REVIEWER:   Candida George
FINAL DISCHARGE DISPOSITION: 01 : Home or Self Care (Routine Discharge)
FINAL REVIEWER:  Candida George
FINAL REVIEW DATE: 2021
 
  
 
DCP Focus Questions & Answers
 
DCP Screen
QUESTION: ANSWER
High Risk Factors: : Hosp related to CHF, COPD, DM, End Stage Ds, CVA, CA
 
 
 
 
DCP Evaluation
QUESTION: ANSWER
Patient's ability to cope with chronic illness : d. No chronic illness
Patient and/or caregiver agree upon recommended discharge plan? : Yes
Family / Caregiver's ability to cope with chronic illness: : a. Adequate 
(ability to meet patient's medical needs, ensures patient attends medical 
appts.)
Patient's current cognitive status: : *Oriented to person, place, situation, 
time and present
Physical Status: : Partial care dependence
Physical Status: : Mobility impaired
Does the patient have the ability to pay for or attain post discharge needs 
/ services? : Yes
Functional screen assessment: : Can meet basic needs but may require 
referral for resources
Family / Caregiver's ability to cope with chronic illness: : a. Adequate 
(ability to meet patient's medical needs, ensures patient attends medical 
appts.)
Partial Dependence, assistance required for: : Ambulation / Mobility
Is there a likelihood that the patient will require additional services to 
return to the preadmission environment? : No
Living Arrangements: : Home with Extended Family
Baseline cognitive status: : *Oriented to person, place, situation, time and 
present
Other Equipment comments: : DENIES ANY NEEDS
Results of this evaluation have been discussed with: : Patient
Patient with capacity for self-care or can be cared for in same environment 
as prior to hospitalization? : Yes
Living arrangements comments: : LIVES WITH HER SON AND GRANDSON
Medication Management: : Patient states can read and understand medication 
labels
Medication Management: : Patient states can afford medications
Pharmacy name(s): : [facility]
Does Patient have transportation to get home and to follow-up medical 
appointments when discharged from the hospital? : Yes
Would patient like to participate in any Care Coordination programs (if 
applicable): : Not applicable
Comments: : DAUGHTER WILL BE HER  HOME
Does the patient have electricity at home? : Yes
Does the patient have running water in their house? : Yes
Equipment in use: : Wheelchair
Equipment in use: : Walker - Rolling
Equipment in use: : Ventilator (home)
Equipment in use: : Shower Chair
Equipment in use: : Other
Equipment in use: : Nebulizer
Equipment in use: : Home Oxygen with Nasal Cannula
Equipment in use: : Glucometer
Equipment in use: : Bedside Commode
Mental health screen: : No mental health history
 
Psychosocial status: : Adult with physical limitations
Abuse/Neglect: : None
Resources / Services in place: : [facility] agency on aging
Contact information for resources in use: : DAUGHTER IS HER CARE TAKER
 
 
 
[facility] Re-evaluation
QUESTION: ANSWER
Would patient like to participate in any Care Coordination programs (if 
applicable): : Not applicable
 
 
 
 
 
 
 
PATIENT:  KALYANI PIERSON
ENCOUNTER:  [id number]
MEDICAL RECORD#:  [id number]
ADMISSION DATE:  2021
DISCHARGE DATE:  2021
ATTENDING MD:  KATY SCHWARTZ
:   1960-Aug-25
AGE:  60
MARITAL STATUS:   S
DC PLAN ID:  [id number]
FACILITY:   [facility]
PRINTED ON: 21  10:11  CT
 
 
 
 
 
 
 
 
**All edits/amendments must be made on the electronic document**
 
DICTATION DATE: 21 1010     : VICTORIANO  21 1010     
RPT#: [id number]                                DC DATE:21
                                               STATUS: DIS IN  
[facility]
 [place] [address]
***END OF REPORT***